# Patient Record
Sex: MALE | Race: OTHER | HISPANIC OR LATINO | URBAN - METROPOLITAN AREA
[De-identification: names, ages, dates, MRNs, and addresses within clinical notes are randomized per-mention and may not be internally consistent; named-entity substitution may affect disease eponyms.]

---

## 2022-11-14 ENCOUNTER — EMERGENCY (EMERGENCY)
Facility: HOSPITAL | Age: 32
LOS: 1 days | Discharge: HOME | End: 2022-11-14
Attending: EMERGENCY MEDICINE
Payer: COMMERCIAL

## 2022-11-14 VITALS
TEMPERATURE: 98 F | OXYGEN SATURATION: 98 % | SYSTOLIC BLOOD PRESSURE: 107 MMHG | DIASTOLIC BLOOD PRESSURE: 72 MMHG | HEART RATE: 86 BPM | WEIGHT: 158.73 LBS | RESPIRATION RATE: 18 BRPM

## 2022-11-14 PROCEDURE — 99285 EMERGENCY DEPT VISIT HI MDM: CPT

## 2022-11-14 PROCEDURE — 99284 EMERGENCY DEPT VISIT MOD MDM: CPT

## 2022-11-14 RX ORDER — IBUPROFEN 200 MG
600 TABLET ORAL ONCE
Refills: 0 | Status: COMPLETED | OUTPATIENT
Start: 2022-11-14 | End: 2022-11-14

## 2022-11-14 RX ORDER — ACETAMINOPHEN 500 MG
650 TABLET ORAL ONCE
Refills: 0 | Status: COMPLETED | OUTPATIENT
Start: 2022-11-14 | End: 2022-11-14

## 2022-11-14 RX ORDER — METHOCARBAMOL 500 MG/1
1500 TABLET, FILM COATED ORAL ONCE
Refills: 0 | Status: COMPLETED | OUTPATIENT
Start: 2022-11-14 | End: 2022-11-14

## 2022-11-14 RX ORDER — METHOCARBAMOL 500 MG/1
2 TABLET, FILM COATED ORAL
Qty: 30 | Refills: 0
Start: 2022-11-14 | End: 2022-11-18

## 2022-11-14 RX ADMIN — Medication 600 MILLIGRAM(S): at 21:39

## 2022-11-14 RX ADMIN — Medication 40 MILLIGRAM(S): at 21:37

## 2022-11-14 RX ADMIN — METHOCARBAMOL 1500 MILLIGRAM(S): 500 TABLET, FILM COATED ORAL at 21:37

## 2022-11-14 RX ADMIN — Medication 600 MILLIGRAM(S): at 21:38

## 2022-11-14 RX ADMIN — Medication 650 MILLIGRAM(S): at 21:39

## 2022-11-14 RX ADMIN — Medication 650 MILLIGRAM(S): at 21:37

## 2022-11-14 NOTE — ED PROVIDER NOTE - MUSCULOSKELETAL, MLM
Spine appears normal, range of motion is not limited. ttp at right lower lumbar paraspinal. no midline. no underlying skin changes

## 2022-11-14 NOTE — ED PROVIDER NOTE - CLINICAL SUMMARY MEDICAL DECISION MAKING FREE TEXT BOX
32 yr old male with hx of lower back pain presents to ed c/o lower back pain with numbness and tingling of right leg x 1 day. pt is a  and drives about 10+ hrs. no heavy lifting but just turned and bend over and pain developed. no trauma, no incontinence, no fever, no abd pain, normal bm and urine. feels weak at right leg. no rashes.    lumbar back spasm with right sciatica- no sx of cord compression or infectious. likely due to being a  and muscle spasm flare. - robaxin, tylenol,motrin prednisone.

## 2022-11-14 NOTE — ED PROVIDER NOTE - PATIENT PORTAL LINK FT
You can access the FollowMyHealth Patient Portal offered by [facility] by registering at the following website: [email or web address]. By joining [employer]’s FollowMyHealth portal, you will also be able to view your health information using other applications (apps) compatible with our system.

## 2022-11-14 NOTE — ED PROVIDER NOTE - OBJECTIVE STATEMENT
32 yr old male with hx of lower back pain presents to ed c/o lower back pain with numbness and tingling of right leg x 1 day. pt is a  and drives about 10+ hrs. no heavy lifting but just turned and bend over and pain developed. no trauma, no incontinence, no fever, no abd pain, normal bm and urine. feels weak at right leg. no rashes.

## 2022-11-14 NOTE — ED PROVIDER NOTE - NSFOLLOWUPINSTRUCTIONS_ED_ALL_ED_FT
please use heat packs to area 3x/day 20 mins each session, take motrin 600mg every 6 hrs as needed, tylenol 650mg every 4 hrs as needed, stay active, no heavy lifting and return if symptoms  worsens. see your MD for physical therapy and spine MD. presscribed robaxin and prednisone.

## 2022-11-14 NOTE — ED ADULT TRIAGE NOTE - PAIN: PRESENCE, MLM
Bedside shift change report given to Sergei Rob RN (oncoming nurse) by Basilio Sylvester RN (offgoing nurse). Report included the following information SBAR, ED Summary, Procedure Summary, MAR and Recent Results. complains of pain/discomfort